# Patient Record
Sex: FEMALE | Race: WHITE
[De-identification: names, ages, dates, MRNs, and addresses within clinical notes are randomized per-mention and may not be internally consistent; named-entity substitution may affect disease eponyms.]

---

## 2017-01-25 ENCOUNTER — HOSPITAL ENCOUNTER (OUTPATIENT)
Age: 52
Discharge: HOME | End: 2017-01-25
Payer: COMMERCIAL

## 2017-01-25 DIAGNOSIS — Z12.31: Primary | ICD-10-CM

## 2017-01-25 DIAGNOSIS — R87.619: Primary | ICD-10-CM

## 2017-01-25 DIAGNOSIS — R92.2: ICD-10-CM

## 2017-02-28 ENCOUNTER — HOSPITAL ENCOUNTER (OUTPATIENT)
Age: 52
Discharge: HOME | End: 2017-02-28
Payer: COMMERCIAL

## 2017-02-28 DIAGNOSIS — N63: Primary | ICD-10-CM

## 2017-03-09 ENCOUNTER — HOSPITAL ENCOUNTER (OUTPATIENT)
Age: 52
Discharge: HOME | End: 2017-03-09
Payer: COMMERCIAL

## 2017-03-09 DIAGNOSIS — N60.02: Primary | ICD-10-CM

## 2017-03-09 DIAGNOSIS — N60.01: ICD-10-CM

## 2018-11-26 ENCOUNTER — HOSPITAL ENCOUNTER (OUTPATIENT)
Age: 53
End: 2018-11-26
Payer: COMMERCIAL

## 2018-11-26 DIAGNOSIS — Z12.31: Primary | ICD-10-CM

## 2018-11-26 PROCEDURE — 77063 BREAST TOMOSYNTHESIS BI: CPT

## 2018-11-26 PROCEDURE — 77067 SCR MAMMO BI INCL CAD: CPT

## 2018-11-26 NOTE — DI.MG.S_ITS
BILATERAL DIGITAL SCREENING MAMMOGRAM 3D/2D WITH CAD: 11/26/2018  
   
CLINICAL: Routine screening.    
   
Comparison is made to exam dated:  1/25/2017 mammogram - [facility].  The   
tissue of both breasts is extremely dense, which lowers the sensitivity of mammography.    
   
Current study was also evaluated with a Computer Aided Detection (CAD) system.    
No significant masses, calcifications, or other findings are seen in either breast.    
There has been no significant interval change.  
   
IMPRESSION: NEGATIVE  
There is no mammographic evidence of malignancy. A 1 year screening mammogram is   
recommended.     
   
This exam was interpreted at Station ID: [id number].    
   
NOTE: For mammograms, a report in lay terms will be sent to the patient. Approximately   
15% of breast malignancies will not be visualized mammographically. In the management of   
a palpable breast mass, a negative mammogram must not discourage biopsy of a clinically   
suspicious lesion.  
   
Electronically Signed By: René [email or web address]:11/27/2018 08:57:52    
   
   
letter sent: Normal Exam    
ACR BI-RADS Category 1: Negative [id number]

## 2018-12-06 ENCOUNTER — HOSPITAL ENCOUNTER (OUTPATIENT)
Age: 53
End: 2018-12-06
Payer: COMMERCIAL

## 2018-12-06 DIAGNOSIS — R22.42: Primary | ICD-10-CM

## 2018-12-06 PROCEDURE — 76882 US LMTD JT/FCL EVL NVASC XTR: CPT

## 2018-12-06 NOTE — DI.US.S_ITS
PROCEDURE:  US EXTREMITY NONVASC LOWER LT  
   
INDICATIONS:  LEFT THIGH LUMP  
   
TECHNIQUE: Targeted sonographic imaging at the site of the patient's palpable abnormality   
was performed on the anterolateral aspect of the mid to lower left thigh.    
   
COMPARISON:  None.  
   
FINDINGS: Targeted sonographic imaging of the anterolateral aspect of the mid to lower   
left thigh was performed at the site of patient's palpable abnormality.  No sonographic   
abnormality is identified to correlate with the patient's area of concern.  Specifically,   
there is no cystic or solid mass identified.  
   
IMPRESSION: No sonographic abnormality is identified to correlate with the patient's   
palpable abnormality along the anterolateral mid to lower left thigh.  Please consider   
MRI with contrast for further evaluation.  
   
   
   
Dictated by: Pablito Farr M.D. on 12/06/2018 at 13:35       
Approved by: Pablito Farr M.D. on 12/06/2018 at 13:36

## 2018-12-31 ENCOUNTER — HOSPITAL ENCOUNTER (OUTPATIENT)
Dept: HOSPITAL 76 - LAB.WCP | Age: 53
Discharge: HOME | End: 2018-12-31
Attending: FAMILY MEDICINE
Payer: COMMERCIAL

## 2018-12-31 DIAGNOSIS — E78.5: ICD-10-CM

## 2018-12-31 DIAGNOSIS — Z00.00: Primary | ICD-10-CM

## 2018-12-31 DIAGNOSIS — M21.852: ICD-10-CM

## 2018-12-31 DIAGNOSIS — R89.6: ICD-10-CM

## 2018-12-31 DIAGNOSIS — R60.0: ICD-10-CM

## 2018-12-31 LAB
T4 FREE SERPL-MCNC: 0.76 NG/DL (ref 0.58–1.64)
TSH SERPL-ACNC: 1.63 UIU/ML (ref 0.34–5.6)

## 2018-12-31 PROCEDURE — 36415 COLL VENOUS BLD VENIPUNCTURE: CPT

## 2018-12-31 PROCEDURE — 84481 FREE ASSAY (FT-3): CPT

## 2018-12-31 PROCEDURE — 84439 ASSAY OF FREE THYROXINE: CPT

## 2018-12-31 PROCEDURE — 84443 ASSAY THYROID STIM HORMONE: CPT

## 2019-01-04 ENCOUNTER — HOSPITAL ENCOUNTER (OUTPATIENT)
Dept: HOSPITAL 76 - LAB.WCP | Age: 54
Discharge: HOME | End: 2019-01-04
Attending: FAMILY MEDICINE
Payer: COMMERCIAL

## 2019-01-04 DIAGNOSIS — E78.5: ICD-10-CM

## 2019-01-04 DIAGNOSIS — Z00.00: Primary | ICD-10-CM

## 2019-01-04 LAB
ALBUMIN DIAFP-MCNC: 3.8 G/DL (ref 3.2–5.5)
ALBUMIN/GLOB SERPL: 1.3 {RATIO} (ref 1–2.2)
ALP SERPL-CCNC: 114 IU/L (ref 42–121)
ALT SERPL W P-5'-P-CCNC: 25 IU/L (ref 10–60)
ANION GAP SERPL CALCULATED.4IONS-SCNC: 8 MMOL/L (ref 6–13)
AST SERPL W P-5'-P-CCNC: 23 IU/L (ref 10–42)
BASOPHILS NFR BLD AUTO: 0.1 10^3/UL (ref 0–0.1)
BASOPHILS NFR BLD AUTO: 0.9 %
BILIRUB BLD-MCNC: 0.7 MG/DL (ref 0.2–1)
BUN SERPL-MCNC: 14 MG/DL (ref 6–20)
CALCIUM UR-MCNC: 8.8 MG/DL (ref 8.5–10.3)
CHLORIDE SERPL-SCNC: 104 MMOL/L (ref 101–111)
CHOLEST SERPL-MCNC: 331 MG/DL
CO2 SERPL-SCNC: 27 MMOL/L (ref 21–32)
CREAT SERPLBLD-SCNC: 0.3 MG/DL (ref 0.4–1)
EOSINOPHIL # BLD AUTO: 0.3 10^3/UL (ref 0–0.7)
EOSINOPHIL NFR BLD AUTO: 4.5 %
ERYTHROCYTE [DISTWIDTH] IN BLOOD BY AUTOMATED COUNT: 13.4 % (ref 12–15)
EST. AVERAGE GLUCOSE BLD GHB EST-MCNC: 114 MG/DL (ref 70–100)
GFRSERPLBLD MDRD-ARVRAT: 235 ML/MIN/{1.73_M2} (ref 89–?)
GLOBULIN SER-MCNC: 3 G/DL (ref 2.1–4.2)
GLUCOSE SERPL-MCNC: 101 MG/DL (ref 70–100)
HB2 TOTAL: 15.7 G/DL
HBA1C BLD-MCNC: 0.6 G/DL
HDLC SERPL-MCNC: 45 MG/DL
HDLC SERPL: 7.4 {RATIO} (ref ?–4.4)
HEMOGLOBIN A1C %: 5.6 % (ref 4.6–6.2)
HGB UR QL STRIP: 15 G/DL (ref 12–16)
LDLC SERPL CALC-MCNC: 247 MG/DL
LDLC/HDLC SERPL: 5.5 {RATIO} (ref ?–4.4)
LYMPHOCYTES # SPEC AUTO: 2.5 10^3/UL (ref 1.5–3.5)
LYMPHOCYTES NFR BLD AUTO: 38.9 %
MCH RBC QN AUTO: 29.8 PG (ref 27–31)
MCHC RBC AUTO-ENTMCNC: 33.5 G/DL (ref 32–36)
MCV RBC AUTO: 88.9 FL (ref 81–99)
MONOCYTES # BLD AUTO: 0.4 10^3/UL (ref 0–1)
MONOCYTES NFR BLD AUTO: 6.8 %
NEUTROPHILS # BLD AUTO: 3.2 10^3/UL (ref 1.5–6.6)
NEUTROPHILS # SNV AUTO: 6.5 X10^3/UL (ref 4.8–10.8)
NEUTROPHILS NFR BLD AUTO: 48.9 %
PDW BLD AUTO: 10 FL (ref 7.9–10.8)
PLATELET # BLD: 301 10^3/UL (ref 130–450)
PROT SPEC-MCNC: 6.8 G/DL (ref 6.7–8.2)
RBC MAR: 5.05 10^6/UL (ref 4.2–5.4)
SODIUM SERPLBLD-SCNC: 139 MMOL/L (ref 135–145)
VLDLC SERPL-SCNC: 39 MG/DL

## 2019-01-04 PROCEDURE — 80053 COMPREHEN METABOLIC PANEL: CPT

## 2019-01-04 PROCEDURE — 83036 HEMOGLOBIN GLYCOSYLATED A1C: CPT

## 2019-01-04 PROCEDURE — 36415 COLL VENOUS BLD VENIPUNCTURE: CPT

## 2019-01-04 PROCEDURE — 83721 ASSAY OF BLOOD LIPOPROTEIN: CPT

## 2019-01-04 PROCEDURE — 85025 COMPLETE CBC W/AUTO DIFF WBC: CPT

## 2019-01-04 PROCEDURE — 80061 LIPID PANEL: CPT

## 2019-03-05 ENCOUNTER — HOSPITAL ENCOUNTER (OUTPATIENT)
Age: 54
Discharge: HOME | End: 2019-03-05
Payer: COMMERCIAL

## 2019-03-05 VITALS
TEMPERATURE: 98.06 F | SYSTOLIC BLOOD PRESSURE: 109 MMHG | OXYGEN SATURATION: 98 % | RESPIRATION RATE: 16 BRPM | DIASTOLIC BLOOD PRESSURE: 63 MMHG | HEART RATE: 80 BPM

## 2019-03-05 VITALS
DIASTOLIC BLOOD PRESSURE: 51 MMHG | TEMPERATURE: 97.16 F | OXYGEN SATURATION: 97 % | HEART RATE: 81 BPM | SYSTOLIC BLOOD PRESSURE: 98 MMHG | RESPIRATION RATE: 14 BRPM

## 2019-03-05 VITALS — BODY MASS INDEX: 33.3 KG/M2

## 2019-03-05 DIAGNOSIS — K57.30: ICD-10-CM

## 2019-03-05 DIAGNOSIS — Z12.11: Primary | ICD-10-CM

## 2019-03-05 PROCEDURE — 45378 DIAGNOSTIC COLONOSCOPY: CPT

## 2019-03-05 PROCEDURE — 0DJD8ZZ INSPECTION OF LOWER INTESTINAL TRACT, VIA NATURAL OR ARTIFICIAL OPENING ENDOSCOPIC: ICD-10-PCS | Performed by: SPECIALIST

## 2019-03-05 PROCEDURE — 99152 MOD SED SAME PHYS/QHP 5/>YRS: CPT

## 2019-03-05 NOTE — PM.HP.1
History of Present Illness
Date Patient Seen: 03/05/19
Time Patient Seen: 08:45
Chief complaint: EDOSCOPY
Narrative:   Patient is a woman who is 53 in his here for her 1st screening colonoscopy.  She has a sister with polyps.  No family history of colon cancer.

Patient History
Medical History

Healthy adult (Acute)


Surgical History (Reviewed 03/05/19 @ 08:46 by Brandan Brownlee MD)

History of D&C (Resolved)
Status post surgery (03/27/17)



Meds
Home Medications

 Medication  Instructions  Recorded  Confirmed  Type
No Known Home Medications  03/05/19 03/05/19 History


Allergies

Allergy/AdvReac Type Severity Reaction Status Date / Time
Penicillins [PENICILLINS] Allergy Severe Rash Verified 03/05/19 08:12
loratadine Allergy Unknown  Verified 03/05/19 08:12
[From CLARITIN-D 12 HOUR]     
pseudoephedrine Allergy Unknown  Verified 03/05/19 08:12
[From CLARITIN-D 12 HOUR]     



Review of Systems
Review of Systems
All systems reviewed & are unremarkable except as noted in HPI and below

Exam
Vital Signs
(past 8 hours):  -

 03/05/19
08:01
Temperature 98.0 F
Pulse Rate 80
Respiratory Rate 16
Blood Pressure 109/63
Pulse Oximetry 98



Oxygen Delivery Method         Room Air



Narrative
Exam Narrative:  pleasant cooperative woman in no apparent distress.  Her eyes are nonicteric.  Lungs are clear to auscultation no rales or rhonchi.  Heart regular rate and rhythm no murmur gallop.  Abdomen is mildly protuberant soft nontender 
without mass.  Patient is alert and oriented x3.

Assessment & Plan
Assessment & Plan narrative:   Patient for screening colonoscopy.  I have discussed the procedure and the rationale with the patient including risks of bleeding, perforation which would necessitate a major operation, failure to find remove all 
lesions and the potential to tattoo.  They appeared to understand and wished to proceed.

## 2019-03-05 NOTE — PM.OP.ENDO
Operative Date/Time/Diagnoses
Date of procedure: 03/05/19
Time of procedure: 09:21
Pre-op diagnosis:   Screening exam.  First colonoscopy. 

Post-op diagnosis: same (  Sigmoid diverticulosis occasional)

Procedure & Clinicians
Study performed:  colonoscopy
Same procedure as scheduled: Yes
Indications:  screening
Surgeon: Brandan Brownlee
Procedure Notes
SCOAP/Timeout:  performed
Procedure in detail:  The patient was placed in the left lateral decubitus position and underwent IV sedation directed by the surgeon consisting of fentanyl and Versed.  Digital exam was unremarkable.  The scope was inserted and advanced through the 
rectum into the sigmoid, descending, transverse, and ascending colon. a few diverticula were noted in the  sigmoid.  The cecum was reached identified by the ileocecal valve and the appendiceal opening.  The ileocecal valve was  
successfullycannulated.  The terminal ileum was normal in appearance.  The scope was gradually brought out.  no Polyps were found.  The scope ultimately was retroflexed in the rectum.  The appearance was normal.  The scope was removed and the 
patient tolerated the procedure well.   prep was very good
Scope withdrawal time:  almost 7 min
Sedation minutes: 22
Findings: diverticulosis ( sigmoid)
Specimen(s): none sent
Recommendations: Colonscopy in 10 years
Follow up: as needed
Disposition: PACU

## 2019-03-05 NOTE — PM.PREOP
Pre-operative Note
Interval Note
History & Physical reviewed/Exam performed by Physician: Yes
Changes to H&P: No
ASA Class (for procedural sedation): I

## 2020-06-06 ENCOUNTER — HOSPITAL ENCOUNTER (EMERGENCY)
Age: 55
Discharge: HOME | End: 2020-06-06
Payer: COMMERCIAL

## 2020-06-06 VITALS
RESPIRATION RATE: 16 BRPM | TEMPERATURE: 97.5 F | DIASTOLIC BLOOD PRESSURE: 70 MMHG | HEART RATE: 78 BPM | SYSTOLIC BLOOD PRESSURE: 131 MMHG | OXYGEN SATURATION: 98 %

## 2020-06-06 VITALS — BODY MASS INDEX: 33.3 KG/M2

## 2020-06-06 DIAGNOSIS — H10.13: Primary | ICD-10-CM

## 2020-06-06 PROCEDURE — 99281 EMR DPT VST MAYX REQ PHY/QHP: CPT

## 2020-06-06 NOTE — ED_ITS
"HPI - Eye Problem    
General    
Chief complaint: Eye Problems    
Stated complaint: Floaters/closey eyes    
Time Seen by Provider: 06/06/20 11:32    
Source: patient    
Mode of arrival: Ambulatory    
Limitations: no limitations    
History of Present Illness    
HPI Narrative: Patient awoke this morning with bilateral clear drainage/tearing   
of the eyes.  No pain.  Had crusting left greater than right eye.  Noticed some   
floaters in the white of the eye.  Not in the central part of the vision.  She   
saw them in the mirror.  She use over-the-counter eyedrops and symptoms have   
resolved.  No changes in vision.  No blurred or double vision.  No eye pain.  No  
fever or chills.  Patient had LASIK surgery 4 years ago Dr. Van.  Visual acuity  
completed 20/25 on the right 20/20 on the left.  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    
Related Data    
                                Home Medications    
    
    
    
 Medication  Instructions  Recorded  Confirmed    
     
No Known Home Medications  03/05/19 03/05/19    
    
    
    
                                    Allergies    
    
    
    
Allergy/AdvReac Type Severity Reaction Status Date / Time    
     
Penicillins [PENICILLINS] Allergy Severe Rash Verified 06/06/20 11:30    
     
loratadine Allergy Unknown  Verified 06/06/20 11:30    
    
[From CLARITIN-D 12 HOUR]         
     
pseudoephedrine Allergy Unknown  Verified 06/06/20 11:30    
    
[From CLARITIN-D 12 HOUR]         
    
    
    
    
Review of Systems    
Review of Systems    
Narrative: GENERAL: Denies chills, fatigue, malaise, fever, sweats.     
HEENT:  Denies any eye pain.  Has discharge from the eyes    
SKIN: Denies rash, skin lesions, or other     
NEUROLOGIC: Denies weakness, headache, numbness, change in speech, confusion,   
seizures, incoordination.    
PSYCHIATRIC: No concerning psychosocial issues.    
    
ROS Unobtainable: All systems reviewed & are unremarkable except as noted in HPI  
and below    
    
Patient History    
Medical History (Reviewed 06/06/20 @ 12:17 by Yves Farnsworth MD)    
    
Healthy adult (Acute)    
    
    
Surgical History (Reviewed 06/06/20 @ 12:17 by Yves Farnsworth MD)    
    
History of D&C (Resolved)    
Status post surgery (03/27/17)    
    
    
Social History (Reviewed 06/06/20 @ 12:17 by Yves Farnsworth MD)    
Smoking Status:  Never smoker     
    
    
Smoking Status: Never smoker    
Substance Use Type: does not use    
    
Exam    
Narrative    
Exam Narrative: GENERAL: patient appears stated age. Well-nourished, well-  
developed patient, in no distress, not toxic    
HEAD: Atraumatic. Normocephalic.     
EYES: Pupils equal round and reactive. Extraocular motions intact. No scleral   
icterus. No injection or drainage.  Everted bilateral upper and lower eyelids no  
foreign body.  Use a slit lamp.  No foreign body seen.  Clear conjunctiva   
bilaterally.  Clear sclerae bilaterally.  Bilateral upper and lower eyelids no   
erythema or edema.  No indication for was line bedtime or flourescein.  No eye   
pain with movement.    
    
NEURO: AOx3.    
SKIN: No rash or erythema of visible areas    
    
Initial Vital Signs    
Initial Vital Signs: Vital Signs    
    
    
    
Temperature  97.5 F L  06/06/20 11:30    
     
Pulse Rate  78   06/06/20 11:30    
     
Respiratory Rate  16   06/06/20 11:30    
     
Blood Pressure  131/70   06/06/20 11:30    
     
Pulse Oximetry  98   06/06/20 11:30    
    
    
    
    
Course    
Course    
Course Narrative: Patient does not want any prescriptions.  She desires   
over-the-counter allergy medication    
Vital Signs    
Vital signs:                   Vital Signs - 8 hr    
    
    
    
 06/06/20    
11:30    
     
Temperature 97.5 F L    
     
Pulse Rate 78    
     
Respiratory Rate 16    
     
Bloo
[id number]|2020-06-06 12:15:12|2020-06-06 12:15:12|ED.EYEPROB||||"HPI - Eye Problem

## 2020-06-18 ENCOUNTER — HOSPITAL ENCOUNTER (EMERGENCY)
Age: 55
Discharge: HOME | End: 2020-06-18
Payer: COMMERCIAL

## 2020-06-18 VITALS
RESPIRATION RATE: 19 BRPM | HEART RATE: 89 BPM | DIASTOLIC BLOOD PRESSURE: 79 MMHG | OXYGEN SATURATION: 98 % | SYSTOLIC BLOOD PRESSURE: 135 MMHG

## 2020-06-18 VITALS
HEART RATE: 88 BPM | RESPIRATION RATE: 18 BRPM | SYSTOLIC BLOOD PRESSURE: 140 MMHG | OXYGEN SATURATION: 97 % | DIASTOLIC BLOOD PRESSURE: 70 MMHG

## 2020-06-18 VITALS
OXYGEN SATURATION: 97 % | SYSTOLIC BLOOD PRESSURE: 120 MMHG | HEART RATE: 97 BPM | RESPIRATION RATE: 17 BRPM | DIASTOLIC BLOOD PRESSURE: 82 MMHG | TEMPERATURE: 97.9 F

## 2020-06-18 DIAGNOSIS — M25.512: Primary | ICD-10-CM
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
## 2020-06-18 PROCEDURE — 99283 EMERGENCY DEPT VISIT LOW MDM: CPT

## 2020-06-18 PROCEDURE — 73030 X-RAY EXAM OF SHOULDER: CPT

## 2020-06-18 PROCEDURE — 96372 THER/PROPH/DIAG INJ SC/IM: CPT

## 2020-06-18 NOTE — ED.UPPEXIN
"HPI - Extremity Injury (Upper)
<Rachel Parmer, FNP-BC - Last Filed: 06/18/20 17:31>
General
Chief Complaint: Extremity Injury, Upper
Stated Complaint: fall, left shoulder pain
Time Seen by Provider: 06/18/20 15:22
Source: patient
Mode of arrival: Ambulatory
Limitations: no limitations
History of Present Illness
HPI narrative: The patient is a 54-year-old female nonsmoker with history of left-sided humeral head fracture who presents with a chief complaint of left shoulder pain.  She had a fall he had landed on her left outstretched hand.  She states she 
slipped on a rock.  She states she has reduced range of motion of her left shoulder does wants to make sure it is not broken again.  She has not taken anything for pain.  Her previous fracture was several years ago.  She also has a history of frozen 
shoulder on that left side.  She denies any hitting of her head neck or back pain or any other injury from the fall.
Related Data
Previous Rx's

 Medication  Instructions  Recorded
ketorolac 10 mg PO TID PRN #15 tab 06/18/20


Allergies

Allergy/AdvReac Type Severity Reaction Status Date / Time
Penicillins [PENICILLINS] Allergy Mild Rash Verified 06/18/20 15:02
loratadine AdvReac Unknown  Verified 06/18/20 15:02
[From CLARITIN-D 12 HOUR]     
pseudoephedrine AdvReac Unknown  Verified 06/18/20 15:02
[From CLARITIN-D 12 HOUR]     



Review of Systems
<Rachel Parmer, FNP-BC - Last Filed: 06/18/20 17:31>
Review of Systems
Narrative: GENERAL: Denies chills, fatigue, malaise, fever, sweats. 
HEENT: Denies sinus pain, ear pain, sore throat, difficulty swallowing, dizziness.
RESPIRATORY: Denies dyspnea, cough, wheezing, hemoptysis, sputum.
CARDIOVASCULAR: Denies chest pain, palpitations, orthopnea, edema, 
GASTROINTESTINAL: Denies nausea, vomiting, abdominal pain, diarrhea, constipation, melena.
: Denies dysuria, frequency, incontinence, hematuria, urinary retention.
MUSCULOSKELETAL:  See HPI
SKIN: Denies rash, skin lesions, or other
NEUROLOGIC: Denies weakness, headache, numbness, change in speech, confusion, seizures, incoordination.
PSYCHIATRIC: No concerning psychosocial issues.


12 point review of systems is negative except for those stated above

Patient History
<Rachel Parmer, FNP-BC - Last Filed: 06/18/20 17:31>
Medical History (Reviewed 06/18/20 @ 17:29 by Rachel Parmer, FNP-BC)

Healthy adult (Acute)


Surgical History (Reviewed 06/18/20 @ 17:29 by Rachel Parmer, FNP-BC)

History of D&C (Resolved)
Status post surgery (03/27/17)


Social History (Reviewed 06/18/20 @ 17:29 by Rachel Parmer, FNP-BC)
Smoking Status:  Never smoker 


Smoking Status: Never smoker
alcohol intake frequency: 0-2 drinks per day
Substance Use Type: does not use

Exam
<Rachel Parmer, FNP-BC - Last Filed: 06/18/20 17:31>
Narrative
Exam Narrative: GENERAL: This is a well-nourished, well-developed patient, in no acute distress
HEAD: Atraumatic. Normocephalic. No temporal or scalp tenderness.
EYES: Pupils equal round and reactive. Extraocular motions intact. No scleral icterus. No injection or drainage. 
ENT: Nose without bleeding, purulent drainage or septal hematoma. Throat without erythema, tonsillar hypertrophy or exudate. Uvula midline. Airway patent.
NECK: Trachea midline. No JVD or lymphadenopathy. Supple, nontender, no meningeal signs.
CARDIOVASCULAR: Regular rate and rhythms, or rhonchi.  No cough.  No increased respiratory effort.  No accessory muscle use
EXTREMITIES:  General pain to palpation left shoulder.  Decreased range of motion all fields left shoulder.  Negative empty can test positive radial pulse left hand.  Good  strength left hand.  Cap refill less than 2 seconds all fingers left 
hand.  No pain to palpation left wrist or hand.  No snuffbox pain to palpation.
BACK: Nontender without deformity or crepitance. No flank tenderness.
NEURO: AOx3.
SKIN: No rash or erythema visible skin.  No erythema ecchymosis noted left shoulder.

Initial Vital Signs
In
[id number]|2020-06-18 16:37:00|2020-06-18 16:37:00|ED_ITS|RPARMER|Emergency Department|[id number]|"HPI - Extremity Injury (Upper)

## 2020-06-18 NOTE — DI.RAD.S_ITS
PROCEDURE:  XR SHOULDER LT MIN 2V  
   
INDICATIONS:  injury  
   
TECHNIQUE: 3 views of the shoulder were acquired.    
   
COMPARISON:  None.  
   
FINDINGS:    
   
Bones:  No fractures or dislocations.  No suspicious bony lesions.  Visualized ribs   
appear intact.    
   
Soft tissues:  No suspicious soft tissue calcifications.    
   
IMPRESSION:    
No acute osseous abnormality.  
   
   
   
Dictated by: Clinton Sánchez M.D. on 6/18/2020 at 15:33       
Approved by: Clinton Sánchez M.D. on 6/18/2020 at 15:34